# Patient Record
Sex: MALE | Employment: STUDENT | URBAN - METROPOLITAN AREA
[De-identification: names, ages, dates, MRNs, and addresses within clinical notes are randomized per-mention and may not be internally consistent; named-entity substitution may affect disease eponyms.]

---

## 2022-01-11 DIAGNOSIS — Z13.6 SCREENING FOR HEART DISEASE: ICD-10-CM

## 2022-01-12 ENCOUNTER — OFFICE VISIT (OUTPATIENT)
Dept: ORTHOPEDICS | Facility: CLINIC | Age: 22
End: 2022-01-12
Payer: COMMERCIAL

## 2022-01-12 VITALS
BODY MASS INDEX: 22.69 KG/M2 | WEIGHT: 176.8 LBS | SYSTOLIC BLOOD PRESSURE: 134 MMHG | HEART RATE: 84 BPM | HEIGHT: 74 IN | DIASTOLIC BLOOD PRESSURE: 83 MMHG

## 2022-01-12 DIAGNOSIS — Z02.5 SPORTS PHYSICAL: Primary | ICD-10-CM

## 2022-01-12 LAB
ATRIAL RATE - MUSE: 79 BPM
DIASTOLIC BLOOD PRESSURE - MUSE: NORMAL MMHG
INTERPRETATION ECG - MUSE: NORMAL
P AXIS - MUSE: 68 DEGREES
PR INTERVAL - MUSE: 154 MS
QRS DURATION - MUSE: 92 MS
QT - MUSE: 360 MS
QTC - MUSE: 412 MS
R AXIS - MUSE: 78 DEGREES
SYSTOLIC BLOOD PRESSURE - MUSE: NORMAL MMHG
T AXIS - MUSE: 50 DEGREES
VENTRICULAR RATE- MUSE: 79 BPM

## 2022-01-12 ASSESSMENT — MIFFLIN-ST. JEOR: SCORE: 1886.71

## 2022-01-12 NOTE — LETTER
Date:January 13, 2022      Patient was self referred, no letter generated. Do not send.        [facility] Health Information

## 2022-01-12 NOTE — PROGRESS NOTES
"Jesús Roman is an incoming freshman .  He is from the [facility] and went to [employer].  He is relatively healthy, he takes no medicines, he has no allergies.  He has not had a surgery before.  Jesús has had a left wrist fracture and a left hand fracture, two thousand eighteen and two thousand twenty respectively.  These were suffered during football.  These were both treated nonoperatively with success and no residual symptoms.  He has had three concussions, these were all football related.  He recovered well from all of these.    Vitals: /83   Pulse 84   Ht 1.88 m (6' 2\")   Wt 80.2 kg (176 lb 12.8 oz)   BMI 22.70 kg/m    BMI= Body mass index is 22.7 kg/m .  Sport(s): Baseball    Vision: Right Eye: 20/20 Left Eye: 20/20 Both Eyes: 20/20  Correction: none  Pupils: equal    Concussions: Concussion fact sheet reviewed. Student Athlete gave written and verbal agreement to report any suspected concussions.    General/Medical  Eyes/Vision: Normal  Ears/Hearing: Normal  Nose: Normal  Mouth/Dental: Normal  Throat: Normal  Thyroid: Normal  Lymph Nodes: Normal  Lungs: Normal  Abdomen: Normal  Skin: Normal    Musculoskeletal/Orthopaedic  Elbow/Forearm: Normal  Wrist/Hand/Fingers: Normal    Cardiovascular Screening  Heart Murmur:No   Symmetric Femoral pulses: Yes    Stigmata of Marfan's Syndrome - if appropriate:  Not applicable    EKG  Jarad Roman's EKG performed for clearance to participate in intercollegiate athletics at the [facility] has been reviewed on 01/12/22.  Findings are normal.      Additional follow up is not needed at this time.       COMMENTS, RECOMMENDATIONS and PARTICIPATION STATUS  Cleared for baseball from a medical perspective.    DO YANNI MurrellM      "

## 2022-01-12 NOTE — LETTER
"  1/12/2022      RE: Jarad Roman  [address] [facility] [address]       Jesús Roman is an incoming freshman .  He is [facility] the [facility] and went to [employer].  He is relatively healthy, he takes no medicines, he has no allergies.  He has not had a surgery before.  Jesús has had a left wrist fracture and a left hand fracture, two thousand eighteen and two thousand twenty respectively.  These were suffered during football.  These were both treated nonoperatively with success and no residual symptoms.  He has had three concussions, these were all football related.  He recovered well from all of these.    Vitals: /83   Pulse 84   Ht 1.88 m (6' 2\")   Wt 80.2 kg (176 lb 12.8 oz)   BMI 22.70 kg/m    BMI= Body mass index is 22.7 kg/m .  Sport(s): Baseball    Vision: Right Eye: 20/20 Left Eye: 20/20 Both Eyes: 20/20  Correction: none  Pupils: equal    Concussions: Concussion fact sheet reviewed. Student Athlete gave written and verbal agreement to report any suspected concussions.    General/Medical  Eyes/Vision: Normal  Ears/Hearing: Normal  Nose: Normal  Mouth/Dental: Normal  Throat: Normal  Thyroid: Normal  Lymph Nodes: Normal  Lungs: Normal  Abdomen: Normal  Skin: Normal    Musculoskeletal/Orthopaedic  Elbow/Forearm: Normal  Wrist/Hand/Fingers: Normal    Cardiovascular Screening  Heart Murmur:No   Symmetric Femoral pulses: Yes    Stigmata of Marfan's Syndrome - if appropriate:  Not applicable    EKG  Jarad Roman's EKG performed for clearance to participate in intercollegiate athletics at the [facility] has been reviewed on 01/12/22.  Findings are normal.      Additional follow up is not needed at this time.       COMMENTS, RECOMMENDATIONS and PARTICIPATION STATUS  Cleared for baseball from a medical perspective.    DO MAURISIO Murrell DO    "

## 2023-09-29 ENCOUNTER — OFFICE VISIT (OUTPATIENT)
Dept: FAMILY MEDICINE | Facility: CLINIC | Age: 23
End: 2023-09-29
Payer: COMMERCIAL

## 2023-09-29 VITALS
WEIGHT: 183 LBS | HEIGHT: 74 IN | DIASTOLIC BLOOD PRESSURE: 72 MMHG | SYSTOLIC BLOOD PRESSURE: 127 MMHG | BODY MASS INDEX: 23.49 KG/M2 | HEART RATE: 94 BPM

## 2023-09-29 DIAGNOSIS — M77.01 MEDIAL EPICONDYLITIS OF ELBOW, RIGHT: Primary | ICD-10-CM

## 2023-09-29 RX ORDER — DICLOFENAC SODIUM 75 MG/1
75 TABLET, DELAYED RELEASE ORAL 2 TIMES DAILY PRN
Qty: 60 TABLET | Refills: 0 | Status: SHIPPED | OUTPATIENT
Start: 2023-09-29

## 2023-09-29 NOTE — LETTER
"  9/29/2023      RE: Jarad Roman  [address] [facility] [address]     Dear Colleague,    Thank you for referring your patient, Jarad Roman, to the [facility]. Please see a copy of my visit note below.    HISTORY OF PRESENT ILLNESS  Mr. Roman is a pleasant 23 year old year old male who presents to clinic today with the following:  What problem are you here for? Right elbow pain  Increased with throwing the past few weeks  No specific injury  Plays catcher    How long have you had this problem? 2 weeks    Have you had any recent imaging of this problem? Xrays/MRI/CT scans? no              MEDICAL HISTORY  There is no problem list on file for this patient.      Current Outpatient Medications   Medication Sig Dispense Refill    diclofenac (VOLTAREN) 75 MG EC tablet Take 1 tablet (75 mg) by mouth 2 times daily as needed for moderate pain 60 tablet 0       No Known Allergies    No family history on file.  Social History     Socioeconomic History    Marital status: Single     Social Determinants of Health     Interpersonal Safety: Low Risk  (9/29/2023)    Interpersonal Safety     Do you feel physically and emotionally safe where you currently live?: Yes     Within the past 12 months, have you been hit, slapped, kicked or otherwise physically hurt by someone?: No     Within the past 12 months, have you been humiliated or emotionally abused in other ways by your partner or ex-partner?: No       Additional medical/Social/Surgical histories reviewed in [facility] and updated as appropriate.     REVIEW OF SYSTEMS (9/29/2023)  10 point ROS of systems including Constitutional, Eyes, Respiratory, Cardiovascular, Gastroenterology, Genitourinary, Integumentary, Musculoskeletal, Psychiatric, Allergic/Immunologic were all negative except for pertinent positives noted in my HPI.     PHYSICAL EXAM  VSS  Vital Signs: /72   Pulse 94   Ht 1.88 m (6' 2\")   Wt 83 kg (183 lb)   BMI 23.50 kg/m   Patient " declined being weighed. Body mass index is 23.5 kg/m .    General  - normal appearance, in no obvious distress  HEENT  - conjunctivae not injected, moist mucous membranes, normocephalic/atraumatic head, ears normal appearance, no lesions, mouth normal appearance, no scars, normal dentition and teeth present  CV  - normal radial pulse  Pulm  - normal respiratory pattern, non-labored  Musculoskeletal -right elbow  - inspection: normal joint alignment, no obvious deformity, mild soft tissue swelling medially  - palpation: slightly tender at the origin of the common flexor tendon  - ROM:  160 deg flexion   0 deg extension   90 deg pronation   90 deg supination  - strength: 5/5 wrist flexion with elbow flexed, 5/5 with elbow extended, non painful resisted flexion of fingers with elbow flexed, not worse with extension, 5/5  strength  - special tests:  (-) varus  (-) valgus  (-) Tinel's  Neuro  - no sensory or motor deficit, grossly normal coordination, normal muscle tone  Skin  - no ecchymosis, erythema, warmth, or induration, no obvious rash  Psych  - interactive, appropriate, normal mood and affect  ASSESSMENT & PLAN  22 yo male with right elbow medial epicondylitis  Discussed rehab with ATC  rx given for voltaren  Increase activity with throwing as tolerated  followup in 1-2 weeks if not improving      Appropriate PPE was utilized for prevention of spread of Covid-19.  Harris Gonsalez MD, CAM

## 2023-09-29 NOTE — PROGRESS NOTES
"HISTORY OF PRESENT ILLNESS  Mr. Roman is a pleasant 23 year old year old male who presents to clinic today with the following:  What problem are you here for? Right elbow pain  Increased with throwing the past few weeks  No specific injury  Plays catcher    How long have you had this problem? 2 weeks    Have you had any recent imaging of this problem? Xrays/MRI/CT scans? no              MEDICAL HISTORY  There is no problem list on file for this patient.      Current Outpatient Medications   Medication Sig Dispense Refill     diclofenac (VOLTAREN) 75 MG EC tablet Take 1 tablet (75 mg) by mouth 2 times daily as needed for moderate pain 60 tablet 0       No Known Allergies    No family history on file.  Social History     Socioeconomic History     Marital status: Single     Social Determinants of Health     Interpersonal Safety: Low Risk  (9/29/2023)    Interpersonal Safety      Do you feel physically and emotionally safe where you currently live?: Yes      Within the past 12 months, have you been hit, slapped, kicked or otherwise physically hurt by someone?: No      Within the past 12 months, have you been humiliated or emotionally abused in other ways by your partner or ex-partner?: No       Additional medical/Social/Surgical histories reviewed in [facility] and updated as appropriate.     REVIEW OF SYSTEMS (9/29/2023)  10 point ROS of systems including Constitutional, Eyes, Respiratory, Cardiovascular, Gastroenterology, Genitourinary, Integumentary, Musculoskeletal, Psychiatric, Allergic/Immunologic were all negative except for pertinent positives noted in my HPI.     PHYSICAL EXAM  VSS  Vital Signs: /72   Pulse 94   Ht 1.88 m (6' 2\")   Wt 83 kg (183 lb)   BMI 23.50 kg/m   Patient declined being weighed. Body mass index is 23.5 kg/m .    General  - normal appearance, in no obvious distress  HEENT  - conjunctivae not injected, moist mucous membranes, normocephalic/atraumatic head, ears normal appearance, no " lesions, mouth normal appearance, no scars, normal dentition and teeth present  CV  - normal radial pulse  Pulm  - normal respiratory pattern, non-labored  Musculoskeletal -right elbow  - inspection: normal joint alignment, no obvious deformity, mild soft tissue swelling medially  - palpation: slightly tender at the origin of the common flexor tendon  - ROM:  160 deg flexion   0 deg extension   90 deg pronation   90 deg supination  - strength: 5/5 wrist flexion with elbow flexed, 5/5 with elbow extended, non painful resisted flexion of fingers with elbow flexed, not worse with extension, 5/5  strength  - special tests:  (-) varus  (-) valgus  (-) Tinel's  Neuro  - no sensory or motor deficit, grossly normal coordination, normal muscle tone  Skin  - no ecchymosis, erythema, warmth, or induration, no obvious rash  Psych  - interactive, appropriate, normal mood and affect  ASSESSMENT & PLAN  22 yo male with right elbow medial epicondylitis  Discussed rehab with ATC  rx given for voltaren  Increase activity with throwing as tolerated  followup in 1-2 weeks if not improving      Appropriate PPE was utilized for prevention of spread of Covid-19.  Harris Gonsalez MD, CAQSM